# Patient Record
(demographics unavailable — no encounter records)

---

## 2025-03-01 NOTE — DISCUSSION/SUMMARY
[de-identified] : I reviewed patient's radiographs and discussed his condition and treatment options.  Defer further imaging.  Prescribed oral steroids for pain control.   Follow up with neurologist.  Patient voiced understanding and agreement with the plan.

## 2025-03-01 NOTE — PHYSICAL EXAM
[] : patient ambulates without assistive device [No bony abnormalities] : No bony abnormalities [Disc space narrowing] : Disc space narrowing [FreeTextEntry1] : disc space narrowing at L5/S1 [TWNoteComboBox7] : forward flexion 75 degrees

## 2025-03-01 NOTE — HISTORY OF PRESENT ILLNESS
[de-identified] : Patient states that he is having pain from his hip down to his foot since Monday,  2/24/2025 with NKI, possibly from [employer]. Patient states that he has trouble applying pressure on his foot and feels nerve pain from the hip to the foot. Patient saw a chiropractor the next day and was told that his hip and ankle are locked. Patient has a history of cervical fusions, brain lesions and thoracic lesions (diagnosed with subtype of MS 2 months ago when he had drop foot). Patient has been taking Vicodin and gabapentin.